# Patient Record
Sex: MALE | Race: WHITE | NOT HISPANIC OR LATINO | Employment: FULL TIME | URBAN - METROPOLITAN AREA
[De-identification: names, ages, dates, MRNs, and addresses within clinical notes are randomized per-mention and may not be internally consistent; named-entity substitution may affect disease eponyms.]

---

## 2017-03-15 ENCOUNTER — OFFICE VISIT (OUTPATIENT)
Dept: MEDICAL GROUP | Facility: MEDICAL CENTER | Age: 27
End: 2017-03-15
Attending: NURSE PRACTITIONER
Payer: MEDICAID

## 2017-03-15 VITALS
TEMPERATURE: 97.9 F | OXYGEN SATURATION: 97 % | HEART RATE: 86 BPM | DIASTOLIC BLOOD PRESSURE: 72 MMHG | HEIGHT: 74 IN | SYSTOLIC BLOOD PRESSURE: 122 MMHG | BODY MASS INDEX: 22.84 KG/M2 | RESPIRATION RATE: 12 BRPM | WEIGHT: 178 LBS

## 2017-03-15 DIAGNOSIS — R53.83 FATIGUE, UNSPECIFIED TYPE: ICD-10-CM

## 2017-03-15 DIAGNOSIS — M92.523 OSGOOD-SCHLATTER'S DISEASE OF KNEES, BILATERAL: ICD-10-CM

## 2017-03-15 DIAGNOSIS — Z91.018 FOOD ALLERGY: ICD-10-CM

## 2017-03-15 DIAGNOSIS — Z11.3 SCREENING FOR VENEREAL DISEASE: ICD-10-CM

## 2017-03-15 DIAGNOSIS — Z00.00 WELL ADULT EXAM: ICD-10-CM

## 2017-03-15 LAB
INT CON NEG: NEGATIVE
INT CON POS: POSITIVE
S PYO AG THROAT QL: NEGATIVE

## 2017-03-15 PROCEDURE — 99213 OFFICE O/P EST LOW 20 MIN: CPT | Performed by: NURSE PRACTITIONER

## 2017-03-15 PROCEDURE — 87880 STREP A ASSAY W/OPTIC: CPT | Performed by: NURSE PRACTITIONER

## 2017-03-15 PROCEDURE — 99202 OFFICE O/P NEW SF 15 MIN: CPT | Performed by: NURSE PRACTITIONER

## 2017-03-15 NOTE — PROGRESS NOTES
"Chief Complaint:     Mahesh Turk is a 26 y.o. male who presents for a general exam.     Pt is here for a new onset of allergy symptoms, congestion, abd bloating, runny nose and watery eyes. Pt states theses symptoms come on after his inguinal hernia on R side a yr ago. Symptoms get  worse when he is visiting the state of [place]. Pt denies any allergy Hx. Pt reported getting over sickness about a week ago.  Pt further states he has been having some issues with his hernia recovery, stating his pain has been 7-10 and has limited ROM effecting his ADLs. Pt used to be a runner and now can \"barely walk a mile\". Wife here states pt has not been as sexually active as in the the past d/t pain and movement restrictions. Pt is not taking any pain control medications at this time.   Pt denies fevers/N/V.  Pt and wife are planning to move to [facility] however awaiting for f/u with hernia. Pt has workers comp coverage and has an appt scheduled for tomorrow.  All medical, physiological and social Hx reviewed and negative.    He  has no past medical history of Diabetes, Allergy, Cancer (CMS-[facility]), or Asthma.  He  has past surgical history that includes hernia repair.  Family History   Problem Relation Age of Onset   • Hypertension Father    • Alcohol/Drug Father    • Alcohol/Drug Sister    • Cancer Maternal Aunt    • Cancer Maternal Uncle    • Alcohol/Drug Maternal Uncle    • Heart Disease Maternal Grandmother    • Heart Disease Maternal Grandfather    • Stroke Maternal Grandfather      Social History   Substance Use Topics   • Smoking status: Never Smoker    • Smokeless tobacco: No   • Alcohol Use: No       No current outpatient prescriptions on file.     No current facility-administered medications for this visit.    (including changes today)  Allergies: Ibuprofen and Vicodin    Last Td:   Has had HepA/Hep B vaccine: decline   Hx STDs: no  Currently sexually active: yes  Regular exercise: limited, situational, post " "hernia Sx    Review Of Systems  Denies fever, chills, or sweats  Skin: negative for rash, scaling, itching, pigmentation, hair or nail changes. .  Eyes: negative for visual blurring, double vision, eye pain, floaters and discharge from eyes  Ears/Nose/Throat: negative for tinnitus, vertigo, bleeding gums, dental problem and hoarseness, frequent URI's, sinus trouble, persistent sore throat  Respiratory: negative for persistent cough, hemoptysis, dyspnea, recurrent pneumonia or bronchitis, asthma and wheezing  Cardiovascular: negative for palpitations, tachycardia, irregular heart beat, chest pain, or peripheral edema.  Gastrointestinal: negative for poor appetite, dysphagia, nausea, heartburn or reflux, hemorrhoids, constipation or diarrhea.   Genitourinary: negative for nocturia, dysuria, frequency, hesitancy, incontinence, sexually transmitted disease, abnormal penile discharge, or ED.  Musculoskeletal: negative for joint swelling and muscle pain/ soreness. .  Neurologic: negative for migraine headaches, involuntary movements or tremor  Psychiatric: negative for excessive alcohol consumption or illegal drug usage, sleep disturbance, anxiety, depression, sexual difficulties  Hematologic/Lymphatic/Immunologic: negative for anemia, unusual bruising, swollen glands, HIV risk factors  Endocrine: negative for temperature intolerance, polydipsia, polyuria, unintentional weight changes.       PHYSICAL EXAMINATION:  Blood pressure 122/72, pulse 86, temperature 36.6 °C (97.9 °F), resp. rate 12, height 1.886 m (6' 2.25\"), weight 80.74 kg (178 lb), SpO2 97 %.  Body mass index is 22.7 kg/(m^2).  Wt Readings from Last 4 Encounters:   03/15/17 80.74 kg (178 lb)       General appearance:healthy, well developed, well nourished, well-groomed  Psych: alert, no distress, cooperative. Eye contact good, speech goal directed. Affect calm.   Eyes: conjunctivae and sclerae normal, lids and lashes normal, EOMI, PERRLA  ENT: Ears: external " ears normal to inspection, canals clear, TMs pearly gray with normal light reflex and anatomic landmarks, Nose/Sinuses: nose shows no deformity, asymmetry, or inflammation, nasal mucosa normal, no sinus tenderness, Oropharynx: lips normal without lesions, buccal mucosa normal, gums healthy, teeth intact, non-carious, palate normal, tongue midline and normal  Neck: no asymmetry, masses, adenopathy. Thyroid normal to palpation, carotids without bruits, no jugular venous distention  Lungs: clear to auscultation with good excursion, Normal respiratory rate. Chest symmetric no chest wall tenderness.  Cardiovascular: Regular rate and rhythm, no murmur.  No peripheral edema  Abdomen: No CVAT. Abdomen soft, non-tender, no masses palpable. No HSM or palpable renal abnormalities. Bowel sounds normal, no bruits heard.R pelvic tenderness and guarding.  Musculoskeletal: no evidence of joint effusion, ROM of all joints is normal, no crepitation detected, strength grossly normal UE and LE, proximal and distal motor groups. No deformities presentLimited ROM on RLE  Lymphatic: None significantly enlarged supraclavicular, axillary, or inguinal  Skin: color normal, vascularity normal, no rashes or suspicious lesions, no evidence of bleeding or bruising3 lap scars on R side and umbilicus  Neuro: speech normal, mental status intact, gait grossly normal, muscle tone normal.    ASSESSMENT/PLAN:  1. Well adult exam  LIPID PROFILE    CBC WITH DIFFERENTIAL   2. Screening for venereal disease  HIV ANTIBODIES    RPR    HEP C VIRUS ANTIBODY    CHLAMYDIA/GC PCR URINE OR SWAB   3. Food allergy  FOOD ALLERGY PANEL    ALLERGY ZONE 15   4. Fatigue, unspecified type  WESTERGREN SED RATE    URIC A+CAR+RA QN+CRP+ASO    LYME IGG/IGM AB    POCT Rapid Strep A (negative)   5. Osgood-Schlatter's disease of knees, bilateral  Pt is in process of moving to [facility], advised to f/u with established provider at [facility] new location. Discussed condition w/pt,  all questions answered.   Reviewed quad stretch/strength exercises and hamstring stretching     Labs per orders  Counseling about diet, exercise, skin care  Vaccinations per orders  HM:   Next office visit for recheck of chronic medical conditions is due in 3 wk for lab results and review and f/u on hernia consult.  PE, note and plan performed by Lise Romero, current student. I was present for the PE and agree with this note and plan

## 2017-03-15 NOTE — MR AVS SNAPSHOT
"        Mahesh Turk   3/15/2017 1:00 PM   Office Visit   MRN: [id number]    Department[facility]  [facility]   Dept Phone:  [phone]    Description:  Male : 1990   Provider:  DANIA Britt           Allergies as of 3/15/2017     Allergen Noted Reactions    Ibuprofen 2012       Vicodin [Hydrocodone-Acetaminophen] 2012         You were diagnosed with     Well adult exam   [394614]       Screening for venereal disease   [296554]       Food allergy   [706946]       Fatigue, unspecified type   [3477382]       Osgood-Schlatter's disease of knees, bilateral   [0141491]         Vital Signs     Blood Pressure Pulse Temperature Respirations Height Weight    122/72 mmHg 86 36.6 °C (97.9 °F) 12 1.886 m (6' 2.25\") 80.74 kg (178 lb)    Body Mass Index Oxygen Saturation Smoking Status             22.70 kg/m2 97% Never Smoker          Basic Information     Date Of Birth Sex Race Ethnicity Preferred Language    1990 Male White Non- English      Health Maintenance        Date Due Completion Dates    IMM HEP B VACCINE (1 of 3 - Primary Series) 1989 ---    IMM HEP A VACCINE (1 of 2 - Standard Series) 1990 ---    IMM VARICELLA (CHICKENPOX) VACCINE (1 of 2 - 2 Dose Adolescent Series) 2002 ---    IMM DTaP/Tdap/Td Vaccine (1 - Tdap) 2008 ---    IMM INFLUENZA (1) 2016 ---            Current Immunizations     No immunizations on file.      Below and/or attached are the medications your provider expects you to take. Review all of your home medications and newly ordered medications with your provider and/or pharmacist. Follow medication instructions as directed by your provider and/or pharmacist. Please keep your medication list with you and share with your provider. Update the information when medications are discontinued, doses are changed, or new medications (including over-the-counter products) are added; and carry medication information at all times in the " event of emergency situations     Allergies:  IBUPROFEN - (reactions not documented)     VICODIN - (reactions not documented)               Medications  Valid as of: March 15, 2017 -  1:57 PM    Generic Name Brand Name Tablet Size Instructions for use    .                 Medicines prescribed today were sent to:     [id number] - [place], [place] - [address] [facility] [address]    [address] [place] [address]    Phone: [phone] Fax: [phone]    Open 24 Hours?: No      Medication refill instructions:       If your prescription bottle indicates you have medication refills left, it is not necessary to call your provider’s office. Please contact your pharmacy and they will refill your medication.    If your prescription bottle indicates you do not have any refills left, you may request refills at any time through one of the following ways: The online "[employer]" system (except Urgent Care), by calling your provider’s office, or by asking your pharmacy to contact your provider’s office with a refill request. Medication refills are processed only during regular business hours and may not be available until the next business day. Your provider may request additional information or to have a follow-up visit with you prior to refilling your medication.   *Please Note: Medication refills are assigned a new Rx number when refilled electronically. Your pharmacy may indicate that no refills were authorized even though a new prescription for the same medication is available at the pharmacy. Please request the medicine by name with the pharmacy before contacting your provider for a refill.        Your To Do List     Future Labs/Procedures Complete By Expires    CBC WITH DIFFERENTIAL  As directed 3/15/2018    CHLAMYDIA/GC PCR URINE OR SWAB  As directed 3/15/2018    HEP C VIRUS ANTIBODY  As directed 3/15/2018    HIV ANTIBODIES  As directed 3/15/2018    LIPID PROFILE  As directed 3/15/2018     CHRIS SED RATE  As directed 3/15/2018         MyChart Status: Patient Declined

## 2017-03-16 ENCOUNTER — HOSPITAL ENCOUNTER (OUTPATIENT)
Dept: LAB | Facility: MEDICAL CENTER | Age: 27
End: 2017-03-16
Attending: NURSE PRACTITIONER
Payer: MEDICAID

## 2017-03-16 DIAGNOSIS — Z11.3 SCREENING FOR VENEREAL DISEASE: ICD-10-CM

## 2017-03-16 DIAGNOSIS — Z00.00 WELL ADULT EXAM: ICD-10-CM

## 2017-03-16 DIAGNOSIS — R53.83 FATIGUE, UNSPECIFIED TYPE: ICD-10-CM

## 2017-03-16 LAB
BASOPHILS # BLD AUTO: 0.05 K/UL (ref 0–0.12)
BASOPHILS NFR BLD AUTO: 1.2 % (ref 0–1.8)
CHOLEST SERPL-MCNC: 158 MG/DL (ref 100–199)
CRP SERPL HS-MCNC: 0.3 MG/L (ref 0–7.5)
EOSINOPHIL # BLD: 0.17 K/UL (ref 0–0.51)
EOSINOPHIL NFR BLD AUTO: 4 % (ref 0–6.9)
ERYTHROCYTE [DISTWIDTH] IN BLOOD BY AUTOMATED COUNT: 39.8 FL (ref 35.9–50)
ERYTHROCYTE [SEDIMENTATION RATE] IN BLOOD BY WESTERGREN METHOD: 0 MM/HOUR (ref 0–15)
HCT VFR BLD AUTO: 48.8 % (ref 42–52)
HCV AB S/CO SERPL IA: NEGATIVE
HDLC SERPL-MCNC: 47 MG/DL
HGB BLD-MCNC: 16.3 G/DL (ref 14–18)
HIV 1+2 AB+HIV1 P24 AG SERPL QL IA: NON REACTIVE
IMM GRANULOCYTES # BLD AUTO: 0 K/UL (ref 0–0.11)
IMM GRANULOCYTES NFR BLD AUTO: 0 % (ref 0–0.9)
LDLC SERPL CALC-MCNC: 98 MG/DL
LYMPHOCYTES # BLD: 1.94 K/UL (ref 1–4.8)
LYMPHOCYTES NFR BLD AUTO: 45.5 % (ref 22–41)
MCH RBC QN AUTO: 30.1 PG (ref 27–33)
MCHC RBC AUTO-ENTMCNC: 33.4 G/DL (ref 33.7–35.3)
MCV RBC AUTO: 90.2 FL (ref 81.4–97.8)
MONOCYTES # BLD: 0.39 K/UL (ref 0–0.85)
MONOCYTES NFR BLD AUTO: 9.2 % (ref 0–13.4)
NEUTROPHILS # BLD: 1.71 K/UL (ref 1.82–7.42)
NEUTROPHILS NFR BLD AUTO: 40.1 % (ref 44–72)
NRBC # BLD AUTO: 0 K/UL
NRBC BLD-RTO: 0 /100 WBC
PLATELET # BLD AUTO: 246 K/UL (ref 164–446)
PMV BLD AUTO: 11 FL (ref 9–12.9)
RBC # BLD AUTO: 5.41 M/UL (ref 4.7–6.1)
RHEUMATOID FACT SERPL-ACNC: <10 IU/ML (ref 0–14)
TRIGL SERPL-MCNC: 64 MG/DL (ref 0–149)
URATE SERPL-MCNC: 3.9 MG/DL (ref 2.5–8.3)
WBC # BLD AUTO: 4.3 K/UL (ref 4.8–10.8)

## 2017-03-16 PROCEDURE — 86803 HEPATITIS C AB TEST: CPT

## 2017-03-16 PROCEDURE — 85652 RBC SED RATE AUTOMATED: CPT

## 2017-03-16 PROCEDURE — 86038 ANTINUCLEAR ANTIBODIES: CPT

## 2017-03-16 PROCEDURE — 82785 ASSAY OF IGE: CPT

## 2017-03-16 PROCEDURE — 80061 LIPID PANEL: CPT

## 2017-03-16 PROCEDURE — 36415 COLL VENOUS BLD VENIPUNCTURE: CPT

## 2017-03-16 PROCEDURE — 86431 RHEUMATOID FACTOR QUANT: CPT

## 2017-03-16 PROCEDURE — 86617 LYME DISEASE ANTIBODY: CPT

## 2017-03-16 PROCEDURE — 87491 CHLMYD TRACH DNA AMP PROBE: CPT

## 2017-03-16 PROCEDURE — 86141 C-REACTIVE PROTEIN HS: CPT

## 2017-03-16 PROCEDURE — 84550 ASSAY OF BLOOD/URIC ACID: CPT

## 2017-03-16 PROCEDURE — 85025 COMPLETE CBC W/AUTO DIFF WBC: CPT

## 2017-03-16 PROCEDURE — 87591 N.GONORRHOEAE DNA AMP PROB: CPT

## 2017-03-16 PROCEDURE — 86060 ANTISTREPTOLYSIN O TITER: CPT

## 2017-03-16 PROCEDURE — 87389 HIV-1 AG W/HIV-1&-2 AB AG IA: CPT

## 2017-03-16 PROCEDURE — 86003 ALLG SPEC IGE CRUDE XTRC EA: CPT | Mod: 91

## 2017-03-17 LAB
C TRACH DNA GENITAL QL NAA+PROBE: NEGATIVE
N GONORRHOEA DNA GENITAL QL NAA+PROBE: NEGATIVE
SPECIMEN SOURCE: NORMAL

## 2017-03-18 LAB
ASO AB SERPL-ACNC: 171 IU/ML (ref 0–330)
B BURGDOR IGG SER QL IB: NEGATIVE
B BURGDOR IGM SER QL IB: NEGATIVE
NUCLEAR IGG SER QL IA: NORMAL

## 2017-03-22 LAB
A ALTERNATA IGE QN: <0.1 KU/L
A FUMIGATUS IGE QN: <0.1 KU/L
ALMOND IGE QN: 0.1 KU/L
AVOCADO IGE QN: <0.1 KU/L
BANANA IGE QN: 0.1 KU/L
BERMUDA GRASS IGE QN: <0.1 KU/L
BOXELDER IGE QN: <0.1 KU/L
C SPHAEROSPERMUM IGE QN: <0.1 KU/L
CAT DANDER IGE QN: <0.1 KU/L
CELERY IGE QN: <0.1 KU/L
CHESTNUT IGE QN: <0.1 KU/L
CMN PIGWEED IGE QN: <0.1 KU/L
COCONUT IGE QN: <0.1 KU/L
COMMON RAGWEED IGE QN: <0.1 KU/L
COTTONWOOD IGE QN: <0.1 KU/L
COW MILK IGE QN: <0.1 KU/L
D FARINAE IGE QN: 19.3 KU/L
D PTERONYSS IGE QN: 30.4 KU/L
DEPRECATED MISC ALLERGEN IGE RAST QL: ABNORMAL
DOG DANDER IGE QN: <0.1 KU/L
EGG WHITE IGE QN: <0.1 KU/L
GRAPE IGE QN: <0.1 KU/L
IGE SERPL-ACNC: 298 KU/L
KIWIFRUIT IGE QN: <0.1 KU/L
M RACEMOSUS IGE QN: <0.1 KU/L
MOUSE EPITH IGE QN: <0.1 KU/L
MT JUNIPER IGE QN: <0.1 KU/L
MUGWORT IGE QN: <0.1 KU/L
OAT IGE QN: <0.1 KU/L
OLIVE POLN IGE QN: <0.1 KU/L
P NOTATUM IGE QN: <0.1 KU/L
PAPAYA IGE QN: <0.1 KU/L
PEANUT IGE QN: <0.1 KU/L
PECAN/HICK NUT IGE QN: <0.1 KU/L
POTATO IGE QN: <0.1 KU/L
ROACH IGE QN: 0.3 KU/L
SALTWORT IGE QN: <0.1 KU/L
SESAME SEED IGE QN: <0.1 KU/L
SOYBEAN IGE QN: <0.1 KU/L
TIMOTHY IGE QN: 0.21 KU/L
TOMATO IGE QN: <0.1 KU/L
WATERMELON IGE QN: <0.1 KU/L
WHEAT IGE QN: <0.1 KU/L
WHITE ELM IGE QN: <0.1 KU/L
WHITE MULBERRY IGE QN: <0.1 KU/L
WHITE OAK IGE QN: <0.1 KU/L

## 2017-04-03 ENCOUNTER — OFFICE VISIT (OUTPATIENT)
Dept: MEDICAL GROUP | Facility: MEDICAL CENTER | Age: 27
End: 2017-04-03
Attending: NURSE PRACTITIONER
Payer: MEDICAID

## 2017-04-03 VITALS
OXYGEN SATURATION: 97 % | BODY MASS INDEX: 22.59 KG/M2 | WEIGHT: 176 LBS | HEIGHT: 74 IN | HEART RATE: 64 BPM | DIASTOLIC BLOOD PRESSURE: 75 MMHG | TEMPERATURE: 97 F | RESPIRATION RATE: 16 BRPM | SYSTOLIC BLOOD PRESSURE: 118 MMHG

## 2017-04-03 DIAGNOSIS — J30.89 ALLERGIC RHINITIS DUE TO OTHER ALLERGIC TRIGGER: ICD-10-CM

## 2017-04-03 DIAGNOSIS — Z09 FOLLOW UP: ICD-10-CM

## 2017-04-03 PROCEDURE — 99212 OFFICE O/P EST SF 10 MIN: CPT | Performed by: NURSE PRACTITIONER

## 2017-04-03 PROCEDURE — 99213 OFFICE O/P EST LOW 20 MIN: CPT | Performed by: NURSE PRACTITIONER

## 2017-04-03 NOTE — PROGRESS NOTES
"Subjective:     Chief Complaint   Patient presents with   • Follow-Up     lab results      Mahesh Turk is a 26 y.o. male here today for multiple problems as listed below    Mahesh is here to f/u on labs r/t his new-onset malaise, runny nose, sore throat and low grade fevers. He originally thought these symptoms were related to his hernia repair, which failed. He is currently awaiting a follow-up correction surgery, as allowed by workers compensation.    Today he states his runny nose, sore throat symptoms are unchanged. They are persistent, worse in the morning, and improve with exercise. Worsen when he is in a \"dirty house\"    No pertinent past medical history, social history or family medical history     Current medicines (including changes today)  No current outpatient prescriptions on file.     No current facility-administered medications for this visit.     He  has no past medical history of Diabetes, Allergy, Cancer (CMS-[facility]), or Asthma.      Current medications, allergies and problems list reviewed and updated in EPIC.      ROS   As above in HPI. All other systems reviewed and are negative        Objective:     Blood pressure 118/75, pulse 64, temperature 36.1 °C (97 °F), resp. rate 16, height 1.88 m (6' 2\"), weight 79.833 kg (176 lb), SpO2 97 %. Body mass index is 22.59 kg/(m^2).   Physical Exam:  Alert, oriented in no acute distress.  Eye contact is good, speech goal directed, affect calm  HEENT: conjunctiva with mild injection b/l, sclera non-icteric.  Pinna normal.   Oral mucous membranes pink and moist with no lesions.  Neck: No adenopathy or masses in the neck or supraclavicular regions. No JVD.  Ext: no edema, color normal, vascularity normal, temperature normal  Skin: no rashes or lesions in visible areas.      Assessment and Plan:   The following treatment plan was discussed   1. Allergic rhinitis due to other allergic trigger  Reviewed allergy testing, and informed pt he has a dust mite " allergy.   Discussed ways to decrease triggers as well as natural remedies for allergies.   Pt is moving to [place] next month.   Advised they find an ND to treat symptoms as they are hesitant to western medicine.  Educational handouts given   2. Follow up  Pt has follow-up for surgical repair in 2 weeks       Followup: PRN

## 2017-04-03 NOTE — MR AVS SNAPSHOT
"        Mahesh Henrynell   4/3/2017 10:20 AM   Office Visit   MRN: [id number]    Department:  [facility]care [facility]   Dept Phone:  [phone]    Description:  Male : 1990   Provider:  DANIA Britt           Reason for Visit     Follow-Up lab results       Allergies as of 4/3/2017     Allergen Noted Reactions    Ibuprofen 2012       Vicodin [Hydrocodone-Acetaminophen] 2012         Vital Signs     Blood Pressure Pulse Temperature Respirations Height Weight    118/75 mmHg 64 36.1 °C (97 °F) 16 1.88 m (6' 2\") 79.833 kg (176 lb)    Body Mass Index Oxygen Saturation Smoking Status             22.59 kg/m2 97% Never Smoker          Basic Information     Date Of Birth Sex Race Ethnicity Preferred Language    1990 Male White Non- English      Health Maintenance        Date Due Completion Dates    IMM HEP B VACCINE (1 of 3 - Primary Series) 1989 ---    IMM HEP A VACCINE (1 of 2 - Standard Series) 1990 ---    IMM VARICELLA (CHICKENPOX) VACCINE (1 of 2 - 2 Dose Adolescent Series) 2002 ---    IMM DTaP/Tdap/Td Vaccine (1 - Tdap) 2008 ---            Current Immunizations     No immunizations on file.      Below and/or attached are the medications your provider expects you to take. Review all of your home medications and newly ordered medications with your provider and/or pharmacist. Follow medication instructions as directed by your provider and/or pharmacist. Please keep your medication list with you and share with your provider. Update the information when medications are discontinued, doses are changed, or new medications (including over-the-counter products) are added; and carry medication information at all times in the event of emergency situations     Allergies:  IBUPROFEN - (reactions not documented)     VICODIN - (reactions not documented)               Medications  Valid as of: 2017 - 10:55 AM    Generic Name Brand Name Tablet Size Instructions " for use    .                 Medicines prescribed today were sent to:     [id number] - [place], [place] - [address] [facility] [facility] [address] [facility] [address] [facility] [address]    [address] [place] [address]    Phone: [phone] Fax: [phone]    Open 24 Hours?: No      Medication refill instructions:       If your prescription bottle indicates you have medication refills left, it is not necessary to call your provider’s office. Please contact your pharmacy and they will refill your medication.    If your prescription bottle indicates you do not have any refills left, you may request refills at any time through one of the following ways: The online [employer] system (except Urgent Care), by calling your provider’s office, or by asking your pharmacy to contact your provider’s office with a refill request. Medication refills are processed only during regular business hours and may not be available until the next business day. Your provider may request additional information or to have a follow-up visit with you prior to refilling your medication.   *Please Note: Medication refills are assigned a new Rx number when refilled electronically. Your pharmacy may indicate that no refills were authorized even though a new prescription for the same medication is available at the pharmacy. Please request the medicine by name with the pharmacy before contacting your provider for a refill.           MyChart Status: Patient Declined

## 2017-06-05 ENCOUNTER — HOSPITAL ENCOUNTER (OUTPATIENT)
Facility: MEDICAL CENTER | Age: 27
End: 2017-06-05
Attending: NURSE PRACTITIONER
Payer: MEDICAID

## 2017-06-05 ENCOUNTER — OFFICE VISIT (OUTPATIENT)
Dept: MEDICAL GROUP | Facility: MEDICAL CENTER | Age: 27
End: 2017-06-05
Attending: NURSE PRACTITIONER
Payer: MEDICAID

## 2017-06-05 VITALS
HEIGHT: 74 IN | RESPIRATION RATE: 20 BRPM | OXYGEN SATURATION: 96 % | TEMPERATURE: 97.7 F | WEIGHT: 175 LBS | DIASTOLIC BLOOD PRESSURE: 64 MMHG | BODY MASS INDEX: 22.46 KG/M2 | HEART RATE: 80 BPM | SYSTOLIC BLOOD PRESSURE: 106 MMHG

## 2017-06-05 DIAGNOSIS — B37.2 CANDIDAL DERMATITIS: ICD-10-CM

## 2017-06-05 DIAGNOSIS — K59.00 CONSTIPATION, UNSPECIFIED CONSTIPATION TYPE: ICD-10-CM

## 2017-06-05 DIAGNOSIS — R19.7 DIARRHEA, UNSPECIFIED TYPE: ICD-10-CM

## 2017-06-05 DIAGNOSIS — R31.9 HEMATURIA: ICD-10-CM

## 2017-06-05 PROBLEM — M92.523 OSGOOD-SCHLATTER'S DISEASE OF KNEES, BILATERAL: Status: ACTIVE | Noted: 2017-06-05

## 2017-06-05 LAB
APPEARANCE UR: CLEAR
BILIRUB UR STRIP-MCNC: NORMAL MG/DL
COLOR UR AUTO: YELLOW
GLUCOSE UR STRIP.AUTO-MCNC: NORMAL MG/DL
KETONES UR STRIP.AUTO-MCNC: NORMAL MG/DL
LEUKOCYTE ESTERASE UR QL STRIP.AUTO: NORMAL
NITRITE UR QL STRIP.AUTO: NORMAL
PH UR STRIP.AUTO: 5 [PH] (ref 5–8)
PROT UR QL STRIP: NORMAL MG/DL
RBC UR QL AUTO: NORMAL
SP GR UR STRIP.AUTO: 1.02
UROBILINOGEN UR STRIP-MCNC: NORMAL MG/DL

## 2017-06-05 PROCEDURE — 99214 OFFICE O/P EST MOD 30 MIN: CPT | Performed by: NURSE PRACTITIONER

## 2017-06-05 PROCEDURE — 81002 URINALYSIS NONAUTO W/O SCOPE: CPT | Performed by: NURSE PRACTITIONER

## 2017-06-05 PROCEDURE — 81003 URINALYSIS AUTO W/O SCOPE: CPT

## 2017-06-05 PROCEDURE — 99213 OFFICE O/P EST LOW 20 MIN: CPT | Performed by: NURSE PRACTITIONER

## 2017-06-05 RX ORDER — CLOTRIMAZOLE 1 %
CREAM (GRAM) TOPICAL
Qty: 1 TUBE | Refills: 0 | Status: SHIPPED | OUTPATIENT
Start: 2017-06-05

## 2017-06-05 NOTE — MR AVS SNAPSHOT
"        Mahesh Turk   2017 4:20 PM   Office Visit   MRN: [id number]    Department:  [facility]care Center   Dept Phone:  [phone]    Description:  Male : 1990   Provider:  DANIA Britt           Reason for Visit     Urinary Frequency x3days     Rash x1wk in genital area       Allergies as of 2017     Allergen Noted Reactions    Ibuprofen 2012       Vicodin [Hydrocodone-Acetaminophen] 2012         You were diagnosed with     Constipation, unspecified constipation type   [2274301]       Diarrhea, unspecified type   [8111082]       Hematuria   [8096681]       Candidal dermatitis   [398630]         Vital Signs     Blood Pressure Pulse Temperature Respirations Height Weight    106/64 mmHg 80 36.5 °C (97.7 °F) 20 1.867 m (6' 1.5\") 79.379 kg (175 lb)    Body Mass Index Oxygen Saturation Smoking Status             22.77 kg/m2 96% Never Smoker          Basic Information     Date Of Birth Sex Race Ethnicity Preferred Language    1990 Male White Non- English      Problem List              ICD-10-CM Priority Class Noted - Resolved    Constipation K59.00   2017 - Present    Diarrhea R19.7   2017 - Present    Osgood-Schlatter's disease of knees, bilateral M92.51, M92.52   2017 - Present      Health Maintenance        Date Due Completion Dates    IMM HEP B VACCINE (1 of 3 - Primary Series) 1989 ---    IMM HEP A VACCINE (1 of 2 - Standard Series) 1990 ---    IMM VARICELLA (CHICKENPOX) VACCINE (1 of 2 - 2 Dose Adolescent Series) 2002 ---    IMM DTaP/Tdap/Td Vaccine (1 - Tdap) 2008 ---            Current Immunizations     No immunizations on file.      Below and/or attached are the medications your provider expects you to take. Review all of your home medications and newly ordered medications with your provider and/or pharmacist. Follow medication instructions as directed by your provider and/or pharmacist. Please keep your medication list " with you and share with your provider. Update the information when medications are discontinued, doses are changed, or new medications (including over-the-counter products) are added; and carry medication information at all times in the event of emergency situations     Allergies:  IBUPROFEN - (reactions not documented)     VICODIN - (reactions not documented)               Medications  Valid as of: June 05, 2017 -  5:01 PM    Generic Name Brand Name Tablet Size Instructions for use    Clotrimazole (Cream) LOTRIMIN 1 % AAA BID until rash resolves        Probiotic Product   Take  by mouth.        .                 Medicines prescribed today were sent to:     [facility] [address] [place] [place] - [address] AT [facility] [address] [facility] & [facility]    [address] [address]    Phone: [phone] Fax: [phone]    Open 24 Hours?: No    [facility] [address], [place] - [address]    [address] [address]    Phone: [phone] Fax: [phone]    Open 24 Hours?: No      Medication refill instructions:       If your prescription bottle indicates you have medication refills left, it is not necessary to call your provider’s office. Please contact your pharmacy and they will refill your medication.    If your prescription bottle indicates you do not have any refills left, you may request refills at any time through one of the following ways: The online [employer] system (except Urgent Care), by calling your provider’s office, or by asking your pharmacy to contact your provider’s office with a refill request. Medication refills are processed only during regular business hours and may not be available until the next business day. Your provider may request additional information or to have a follow-up visit with you prior to refilling your medication.   *Please Note: Medication refills are assigned a new Rx number when refilled electronically. Your pharmacy may indicate that no  refills were authorized even though a new prescription for the same medication is available at the pharmacy. Please request the medicine by name with the pharmacy before contacting your provider for a refill.        Your To Do List     Future Labs/Procedures Complete By Expires    URINALYSIS,CULTURE IF INDICATED  As directed 6/5/2018      Referral     A referral request has been sent to our patient care coordination department. Please allow 3-5 business days for us to process this request and contact you either by phone or mail. If you do not hear from us by the 5th business day, please call us at [phone].           MyChart Status: Patient Declined

## 2017-06-05 NOTE — PROGRESS NOTES
"Subjective:     Chief Complaint   Patient presents with   • Urinary Frequency     x3days    • Rash     x1wk in genital area      Mahesh Turk is a 26 y.o. male here today for multiple problems as listed below    Mahesh is here for new-onset constipation and diarrhea. States he has alternating episodes of constipation and diarrhea about 2x per week ever since the hernia surgery. Denies nausea, heartburn, or reflux. States the other days of the week he stools easily and feels he has complete evacuation. Has a very healthy diet and eats william seeds daily, is also taking a probiotic. He denies fevers. No outside country travel, although he and his wife were recently camping and bathing in river water, but the symptoms started before that.    He also c/o of a new-onset red rash on his scrotum that started a few days ago. It is itchy, and he thinks it is yeast. His wife also had a yeast infection after their camping trip.    Finally, he is having some new-onset urinary frequency but no urgency or dysuria. This has also been occuring for the past few days. No fevers.     Current medicines (including changes today)  Current Outpatient Prescriptions   Medication Sig Dispense Refill   • Probiotic Product (PROBIOTIC & ACIDOPHILUS EX ST PO) Take  by mouth.     • clotrimazole (LOTRIMIN) 1 % Cream AAA BID until rash resolves 1 Tube 0     No current facility-administered medications for this visit.     He  has no past medical history of Diabetes, Allergy, Cancer (CMS-[facility]), or Asthma.      Current medications, allergies and problems list reviewed and updated in EPIC.      ROS   As above in HPI. All other systems reviewed and are negative        Objective:     Blood pressure 106/64, pulse 80, temperature 36.5 °C (97.7 °F), resp. rate 20, height 1.867 m (6' 1.5\"), weight 79.379 kg (175 lb), SpO2 96 %. Body mass index is 22.77 kg/(m^2).   Physical Exam:  Alert, oriented in no acute distress.  Eye contact is good, speech goal " "directed, affect calm  Lungs: clear to auscultation bilaterally with good excursion.  CV: regular rate and rhythm.  Abdomen: soft, nontender, No CVAT, c/o mild \"twinge\" in LLQ and RMQ, non-radiating  : deferred per pt comfort, he demonstrates a picture of his scrotum which shows a red rash with clearly define borders and maceration      Assessment and Plan:   The following treatment plan was discussed   1. Constipation, unspecified constipation type  REFERRAL TO GASTROENTEROLOGY   2. Diarrhea, unspecified type  REFERRAL TO GASTROENTEROLOGY  Suspect IBS  Declines stool culture and additional labwork today, prefers to wait for GI consult   3. Hematuria  URINALYSIS,CULTURE IF INDICATED   4. Candidal dermatitis  Begin clotrmazole BID until sx resolve       Followup: PRN  "

## 2017-06-06 DIAGNOSIS — R31.9 HEMATURIA: ICD-10-CM

## 2017-06-06 LAB
APPEARANCE UR: CLEAR
BILIRUB UR QL STRIP.AUTO: NEGATIVE
COLOR UR: NORMAL
CULTURE IF INDICATED INDCX: NO UA CULTURE
GLUCOSE UR STRIP.AUTO-MCNC: NEGATIVE MG/DL
KETONES UR STRIP.AUTO-MCNC: NEGATIVE MG/DL
LEUKOCYTE ESTERASE UR QL STRIP.AUTO: NEGATIVE
MICRO URNS: NORMAL
NITRITE UR QL STRIP.AUTO: NEGATIVE
PH UR STRIP.AUTO: 7 [PH]
PROT UR QL STRIP: NEGATIVE MG/DL
RBC UR QL AUTO: NEGATIVE
SP GR UR STRIP.AUTO: 1.01

## 2017-07-12 ENCOUNTER — OFFICE VISIT (OUTPATIENT)
Dept: MEDICAL GROUP | Facility: MEDICAL CENTER | Age: 27
End: 2017-07-12
Attending: INTERNAL MEDICINE
Payer: MEDICAID

## 2017-07-12 VITALS
OXYGEN SATURATION: 97 % | SYSTOLIC BLOOD PRESSURE: 122 MMHG | HEART RATE: 80 BPM | TEMPERATURE: 98.7 F | BODY MASS INDEX: 22.84 KG/M2 | DIASTOLIC BLOOD PRESSURE: 70 MMHG | WEIGHT: 178 LBS | HEIGHT: 74 IN | RESPIRATION RATE: 16 BRPM

## 2017-07-12 DIAGNOSIS — R29.898 POPPING OF TEMPOROMANDIBULAR JOINT ON OPENING OF JAW: ICD-10-CM

## 2017-07-12 DIAGNOSIS — R09.82 POST-NASAL DRIP: ICD-10-CM

## 2017-07-12 PROCEDURE — 99214 OFFICE O/P EST MOD 30 MIN: CPT | Performed by: INTERNAL MEDICINE

## 2017-07-12 PROCEDURE — 99213 OFFICE O/P EST LOW 20 MIN: CPT | Performed by: INTERNAL MEDICINE

## 2017-07-12 ASSESSMENT — PATIENT HEALTH QUESTIONNAIRE - PHQ9: CLINICAL INTERPRETATION OF PHQ2 SCORE: 0

## 2017-07-12 NOTE — ASSESSMENT & PLAN NOTE
Patient reports popping of his jaw on the right for the past month every time he opens his mouth.  He denies jaw pain, jaw getting stuck.  Unsure if he clenches his teeth at night, but thinks he may do this.

## 2017-07-12 NOTE — ASSESSMENT & PLAN NOTE
Patient presents today with about a week of worsening postnasal drip. He states that his throat has been itching and his voice has gotten more hoarse, and he feels like he has mucus draining down the back of his throat. He states he has had worsening allergies ever since he had a mesh placed for his hernia repair. He had some allergy testing done and was very allergic to dust mites. He does not currently take any over-the-counter allergy medications. He prefers a holistic approach. He has never used any nasal sprays.

## 2017-07-12 NOTE — MR AVS SNAPSHOT
"        Mahesh Turk   2017 12:50 PM   Office Visit   MRN: [id number]    [facility]   Dept Phone:  [phone]    Description:  Male : 1990   Provider:  Verna Mazariegos M.D.           Reason for Visit     Follow-Up alergy      Allergies as of 2017     Allergen Noted Reactions    Ibuprofen 2012       Vicodin [Hydrocodone-Acetaminophen] 2012         You were diagnosed with     Post-nasal drip   [888193]         Vital Signs     Blood Pressure Pulse Temperature Respirations Height Weight    122/70 mmHg 80 37.1 °C (98.7 °F) 16 1.867 m (6' 1.5\") 80.74 kg (178 lb)    Body Mass Index Oxygen Saturation Smoking Status             23.16 kg/m2 97% Never Smoker          Basic Information     Date Of Birth Sex Race Ethnicity Preferred Language    1990 Male White Non- English      Problem List              ICD-10-CM Priority Class Noted - Resolved    Constipation K59.00   2017 - Present    Diarrhea R19.7   2017 - Present    Osgood-Schlatter's disease of knees, bilateral M92.51, M92.52   2017 - Present    Post-nasal drip R09.82   2017 - Present      Health Maintenance        Date Due Completion Dates    IMM DTaP/Tdap/Td Vaccine (1 - Tdap) 2008 ---    IMM INFLUENZA (1) 2017 ---            Current Immunizations     No immunizations on file.      Below and/or attached are the medications your provider expects you to take. Review all of your home medications and newly ordered medications with your provider and/or pharmacist. Follow medication instructions as directed by your provider and/or pharmacist. Please keep your medication list with you and share with your provider. Update the information when medications are discontinued, doses are changed, or new medications (including over-the-counter products) are added; and carry medication information at all times in the event of emergency situations     Allergies:  IBUPROFEN - (reactions " not documented)     VICODIN - (reactions not documented)               Medications  Valid as of: July 12, 2017 -  1:17 PM    Generic Name Brand Name Tablet Size Instructions for use    Clotrimazole (Cream) LOTRIMIN 1 % AAA BID until rash resolves        Probiotic Product   Take  by mouth.        .                 Medicines prescribed today were sent to:     [facility] [facility], [place] - [address] AT [facility]    [address] [facility] [place] [address]    Phone: [phone] Fax: [phone]    Open 24 Hours?: No    [facility] [address] [facility], [place] - [address] [facility]    [address] [facility] [address]    Phone: [phone] Fax: [phone]    Open 24 Hours?: No      Medication refill instructions:       If your prescription bottle indicates you have medication refills left, it is not necessary to call your provider’s office. Please contact your pharmacy and they will refill your medication.    If your prescription bottle indicates you do not have any refills left, you may request refills at any time through one of the following ways: The online BeInSync system (except Urgent Care), by calling your provider’s office, or by asking your pharmacy to contact your provider’s office with a refill request. Medication refills are processed only during regular business hours and may not be available until the next business day. Your provider may request additional information or to have a follow-up visit with you prior to refilling your medication.   *Please Note: Medication refills are assigned a new Rx number when refilled electronically. Your pharmacy may indicate that no refills were authorized even though a new prescription for the same medication is available at the pharmacy. Please request the medicine by name with the pharmacy before contacting your provider for a refill.           BeInSync Access Code: [email or web address]  Expires: 8/11/2017 11:33 AM    InstraGrokj[employer] JOLLY secure,  online tool to manage your health information     [employer]’s GemShare® is a secure, online tool that connects you to your personalized health information from the privacy of your home -- day or night - making it very easy for you to manage your healthcare. Once the activation process is completed, you can even access your medical information using the GemShare alexander, which is available for free in the Apple Alexander store or Google Play store.     GemShare provides the following levels of access (as shown below):   My Chart Features   [facility] Doctor [facility]  Specialists [facility]  Urgent  Care Non-[facility]  Primary Care  Doctor   Email your healthcare team securely and privately 24/7 X X X    Manage appointments: schedule your next appointment; view details of past/upcoming appointments X      Request prescription refills. X      View recent personal medical records, including lab and immunizations X X X X   View health record, including health history, allergies, medications X X X X   Read reports about your outpatient visits, procedures, consult and ER notes X X X X   See your discharge summary, which is a recap of your hospital and/or ER visit that includes your diagnosis, lab results, and care plan. X X       How to register for GemShare:  1. Go to  [email or web address].  2. Click on the Sign Up Now box, which takes you to the New Member Sign Up page. You will need to provide the following information:  a. Enter your GemShare Access Code exactly as it appears at the top of this page. (You will not need to use this code after you’ve completed the sign-up process. If you do not sign up before the expiration date, you must request a new code.)   b. Enter your date of birth.   c. Enter your home email address.   d. Click Submit, and follow the next screen’s instructions.  3. Create a GemShare ID. This will be your GemShare login ID and cannot be changed, so think of one that is secure and easy to  remember.  4. Create a [employer] password. You can change your password at any time.  5. Enter your Password Reset Question and Answer. This can be used at a later time if you forget your password.   6. Enter your e-mail address. This allows you to receive e-mail notifications when new information is available in [employer].  7. Click Sign Up. You can now view your health information.    For assistance activating your [employer] account, call [phone]

## 2017-07-12 NOTE — PROGRESS NOTES
"Subjective:   Mahesh Turk is a 27 y.o. male here today for throat itching, dripping    Post-nasal drip  Patient presents today with about a week of worsening postnasal drip. He states that his throat has been itching and his voice has gotten more hoarse, and he feels like he has mucus draining down the back of his throat. He states he has had worsening allergies ever since he had a mesh placed for his hernia repair. He had some allergy testing done and was very allergic to dust mites. He does not currently take any over-the-counter allergy medications. He prefers a holistic approach. He has never used any nasal sprays.     Popping of temporomandibular joint on opening of jaw  Patient reports popping of his jaw on the right for the past month every time he opens his mouth.  He denies jaw pain, jaw getting stuck.  Unsure if he clenches his teeth at night, but thinks he may do this.         Current medicines (including changes today)  Current Outpatient Prescriptions   Medication Sig Dispense Refill   • Probiotic Product (PROBIOTIC & ACIDOPHILUS EX ST PO) Take  by mouth.     • clotrimazole (LOTRIMIN) 1 % Cream AAA BID until rash resolves 1 Tube 0     No current facility-administered medications for this visit.     He  has no past medical history of Diabetes, Allergy, Cancer (CMS-[facility]), or Asthma.    ROS   As above in HPI     Objective:     Blood pressure 122/70, pulse 80, temperature 37.1 °C (98.7 °F), resp. rate 16, height 1.867 m (6' 1.5\"), weight 80.74 kg (178 lb), SpO2 97 %. Body mass index is 23.16 kg/(m^2).   Physical Exam:  Constitutional: Alert, no distress.  Skin: Warm, dry, good turgor, no rashes in visible areas.  Eye: Equal, round and reactive, conjunctiva mildly injected, allergic shiners bilaterally.  ENMT: Lips without lesions, good dentition, oropharynx with mild cobblestoning, TM's obscured by wax bilaterally, nares normal, TMJ non tender to palpation bilaterally.  Neck: Trachea midline, no " masses, no thyromegaly. No cervical or supraclavicular lymphadenopathy  Psych: Alert and oriented x3, normal affect and mood.      Assessment and Plan:   The following treatment plan was discussed    1. Post-nasal drip  History and physical exam are consistent with postnasal drip related to allergies.  We discussed a nasal spray such as flonase, but patient wishes to avoid prescription meds.  I recommended using a laura pot 1-2 times per day to help with symptoms.    -laura pot PRN    2. Jaw popping/TMJ symptoms  Discussed with patient that he may be clenching or grinding his teeth at night. I recommended a  that he can purchase over-the-counter. He has a dentist that he sees and I recommended follow-up with his dentist to make sure his bite is appropriate and for a possible prescription  as well.  -OTC   -follow up with dentist    Followup: Return if symptoms worsen or fail to improve.

## 2017-08-23 ENCOUNTER — OFFICE VISIT (OUTPATIENT)
Dept: MEDICAL GROUP | Facility: MEDICAL CENTER | Age: 27
End: 2017-08-23
Attending: NURSE PRACTITIONER
Payer: MEDICAID

## 2017-08-23 ENCOUNTER — HOSPITAL ENCOUNTER (OUTPATIENT)
Dept: LAB | Facility: MEDICAL CENTER | Age: 27
End: 2017-08-23
Attending: NURSE PRACTITIONER
Payer: MEDICAID

## 2017-08-23 VITALS
TEMPERATURE: 97.7 F | SYSTOLIC BLOOD PRESSURE: 112 MMHG | WEIGHT: 175 LBS | OXYGEN SATURATION: 95 % | HEIGHT: 74 IN | BODY MASS INDEX: 22.46 KG/M2 | RESPIRATION RATE: 14 BRPM | HEART RATE: 84 BPM | DIASTOLIC BLOOD PRESSURE: 62 MMHG

## 2017-08-23 DIAGNOSIS — R19.7 DIARRHEA, UNSPECIFIED TYPE: ICD-10-CM

## 2017-08-23 DIAGNOSIS — R10.84 GENERALIZED ABDOMINAL PAIN: ICD-10-CM

## 2017-08-23 DIAGNOSIS — B37.2 CANDIDAL INTERTRIGO: ICD-10-CM

## 2017-08-23 LAB
HAV IGM SERPL QL IA: NEGATIVE
HBV CORE IGM SER QL: NEGATIVE
HBV SURFACE AG SER QL: NEGATIVE
HCV AB SER QL: NEGATIVE

## 2017-08-23 PROCEDURE — 80074 ACUTE HEPATITIS PANEL: CPT

## 2017-08-23 PROCEDURE — 99214 OFFICE O/P EST MOD 30 MIN: CPT | Performed by: NURSE PRACTITIONER

## 2017-08-23 PROCEDURE — 99212 OFFICE O/P EST SF 10 MIN: CPT | Performed by: NURSE PRACTITIONER

## 2017-08-23 PROCEDURE — 36415 COLL VENOUS BLD VENIPUNCTURE: CPT

## 2017-08-23 RX ORDER — FLUCONAZOLE 150 MG/1
150 TABLET ORAL
Qty: 4 TAB | Refills: 1 | Status: SHIPPED | OUTPATIENT
Start: 2017-08-23 | End: 2017-09-06 | Stop reason: SDUPTHER

## 2017-08-23 NOTE — MR AVS SNAPSHOT
"        Mahesh Turk   2017 11:40 AM   Office Visit   MRN: [id number]    Department[facility]   Dept Phone:  [phone]    Description:  Male : 1990   Provider:  DANIA Britt           Allergies as of 2017     Allergen Noted Reactions    Ibuprofen 2012       Vicodin [Hydrocodone-Acetaminophen] 2012         You were diagnosed with     Diarrhea, unspecified type   [9200477]       Generalized abdominal pain   [774023]       Candidal intertrigo   [483180]         Vital Signs     Blood Pressure Pulse Temperature Respirations Height Weight    112/62 mmHg 84 36.5 °C (97.7 °F) 14 1.867 m (6' 1.5\") 79.379 kg (175 lb)    Body Mass Index Oxygen Saturation Smoking Status             22.77 kg/m2 95% Never Smoker          Basic Information     Date Of Birth Sex Race Ethnicity Preferred Language    1990 Male White Non- English      Problem List              ICD-10-CM Priority Class Noted - Resolved    Constipation K59.00   2017 - Present    Diarrhea R19.7   2017 - Present    Osgood-Schlatter's disease of knees, bilateral M92.51, M92.52   2017 - Present    Post-nasal drip R09.82   2017 - Present    Popping of temporomandibular joint on opening of jaw R29.898   2017 - Present      Health Maintenance        Date Due Completion Dates    IMM DTaP/Tdap/Td Vaccine (1 - Tdap) 2008 ---    IMM INFLUENZA (1) 2017 ---            Current Immunizations     No immunizations on file.      Below and/or attached are the medications your provider expects you to take. Review all of your home medications and newly ordered medications with your provider and/or pharmacist. Follow medication instructions as directed by your provider and/or pharmacist. Please keep your medication list with you and share with your provider. Update the information when medications are discontinued, doses are changed, or new medications (including over-the-counter " products) are added; and carry medication information at all times in the event of emergency situations     Allergies:  IBUPROFEN - (reactions not documented)     VICODIN - (reactions not documented)               Medications  Valid as of: August 23, 2017 - 11:54 AM    Generic Name Brand Name Tablet Size Instructions for use    Clotrimazole (Cream) LOTRIMIN 1 % AAA BID until rash resolves        Fluconazole (Tab) DIFLUCAN 150 MG Take 1 Tab by mouth every 7 days for 28 days.        Probiotic Product   Take  by mouth.        .                 Medicines prescribed today were sent to:     [facility] [address] [place] [address] [place], [place] - [address] AT [facility] [email or web address]    [address] [place] [address]    Phone: [phone] Fax: [phone]    Open 24 Hours?: No      Medication refill instructions:       If your prescription bottle indicates you have medication refills left, it is not necessary to call your provider’s office. Please contact your pharmacy and they will refill your medication.    If your prescription bottle indicates you do not have any refills left, you may request refills at any time through one of the following ways: The online Local Matters system (except Urgent Care), by calling your provider’s office, or by asking your pharmacy to contact your provider’s office with a refill request. Medication refills are processed only during regular business hours and may not be available until the next business day. Your provider may request additional information or to have a follow-up visit with you prior to refilling your medication.   *Please Note: Medication refills are assigned a new Rx number when refilled electronically. Your pharmacy may indicate that no refills were authorized even though a new prescription for the same medication is available at the pharmacy. Please request the medicine by name with the pharmacy before contacting your provider for a refill.           [employer]  Code: [email or web address]  Expires: 9/20/2017  4:30 PM    Arieso  A secure, online tool to manage your health information     [employer]’s Arieso® is a secure, online tool that connects you to your personalized health information from the privacy of your home -- day or night - making it very easy for you to manage your healthcare. Once the activation process is completed, you can even access your medical information using the Arieso alexander, which is available for free in the Apple Alexander store or Google Play store.     Arieso provides the following levels of access (as shown below):   My Chart Features   [facility] Primary Care Doctor [facility]  Specialists [facility]  Urgent  Care Non-[facility]  Primary Care  Doctor   Email your healthcare team securely and privately 24/7 X X X    Manage appointments: schedule your next appointment; view details of past/upcoming appointments X      Request prescription refills. X      View recent personal medical records, including lab and immunizations X X X X   View health record, including health history, allergies, medications X X X X   Read reports about your outpatient visits, procedures, consult and ER notes X X X X   See your discharge summary, which is a recap of your hospital and/or ER visit that includes your diagnosis, lab results, and care plan. X X       How to register for Arieso:  1. Go to  [email or web address].  2. Click on the Sign Up Now box, which takes you to the New Member Sign Up page. You will need to provide the following information:  a. Enter your Arieso Access Code exactly as it appears at the top of this page. (You will not need to use this code after you’ve completed the sign-up process. If you do not sign up before the expiration date, you must request a new code.)   b. Enter your date of birth.   c. Enter your home email address.   d. Click Submit, and follow the next screen’s instructions.  3. Create a Arieso ID. This will be your Arieso login ID and  cannot be changed, so think of one that is secure and easy to remember.  4. Create a [employer] password. You can change your password at any time.  5. Enter your Password Reset Question and Answer. This can be used at a later time if you forget your password.   6. Enter your e-mail address. This allows you to receive e-mail notifications when new information is available in [employer].  7. Click Sign Up. You can now view your health information.    For assistance activating your [employer] account, call [phone]

## 2017-08-23 NOTE — PROGRESS NOTES
"Subjective:   No chief complaint on file.    Mahesh Turk is a 27 y.o. male here today for multiple problems as listed below    Mahesh is here for f/u of his candida in his groin area. It is not resolving with topical fluconazole. It is not painful, no discharge, but it is itchy and bothersome. He wears short shorts often and goes running often.    He is also requesting a hepatitis panel as he has persistent diarrhea and abdominal pain, and was suggested by a friend to get it checked. He denies persistent vomiting, no h/o IVDU, no out of country travel. No acute abdominal illness    Current medicines (including changes today)  Current Outpatient Prescriptions   Medication Sig Dispense Refill   • fluconazole (DIFLUCAN) 150 MG tablet Take 1 Tab by mouth every 7 days for 28 days. 4 Tab 1   • Probiotic Product (PROBIOTIC & ACIDOPHILUS EX ST PO) Take  by mouth.     • clotrimazole (LOTRIMIN) 1 % Cream AAA BID until rash resolves 1 Tube 0     No current facility-administered medications for this visit.     He  has no past medical history of Diabetes, Allergy, Cancer (CMS-[facility]), or Asthma.      Current medications, allergies and problems list reviewed and updated in EPIC.    No pertinent past medical history, social history or family medical history       ROS   As above in HPI. All other systems reviewed and are negative        Objective:     Blood pressure 112/62, pulse 84, temperature 36.5 °C (97.7 °F), resp. rate 14, height 1.867 m (6' 1.5\"), weight 79.379 kg (175 lb), SpO2 95 %. Body mass index is 22.77 kg/(m^2).   Physical Exam:  Alert, oriented in no acute distress.  Eye contact is good, speech goal directed, affect calm  CV: regular rate and rhythm.  Abdomen: soft, nontender, No CVAT  Skin: Declined inspection but states intertrigo candida is unchanged from last visit       Assessment and Plan:   The following treatment plan was discussed   1. Diarrhea, unspecified type  HEPATITIS PANEL ACUTE (4 COMPONENTS) "   2. Generalized abdominal pain  HEPATITIS PANEL ACUTE (4 COMPONENTS)   3. Candidal intertrigo  Diflucan 150mg Qweek x 4 weeks  Do not wear tight clothing or short shorts. Recommend naked or cotton skirt/sarong when at home        Followup: PRN, if sx persist

## 2017-09-06 ENCOUNTER — TELEPHONE (OUTPATIENT)
Dept: MEDICAL GROUP | Facility: MEDICAL CENTER | Age: 27
End: 2017-09-06

## 2017-09-06 RX ORDER — FLUCONAZOLE 150 MG/1
150 TABLET ORAL
Qty: 2 TAB | Refills: 0 | Status: SHIPPED | OUTPATIENT
Start: 2017-09-06 | End: 2017-09-20

## 2017-09-06 NOTE — TELEPHONE ENCOUNTER
Pt called and stated at last visit he was given diflucan for a yeast infection and was advised if no relief he would need to cb to have the dosage increased. He states that he isn't having relief and is requesting an increase in dosing [phone] (home)

## 2017-09-06 NOTE — TELEPHONE ENCOUNTER
Spoke with patient and informed him that it may take up to 6 weeks of 150mg Diflucan weekly for intertrigo candida to resolve. As he has only taken 2 dosages, he should remain on this medication for an additional month. Also advised to use loose-fitting clothing and sleep naked from waist doen to promote airflow. Pt verbalizes understanding and agrees with plan. Will add 2 more diflucan 150mg tabs to pharmacy, as original rx was for 4 weeks only.

## 2017-12-19 ENCOUNTER — OFFICE VISIT (OUTPATIENT)
Dept: MEDICAL GROUP | Facility: MEDICAL CENTER | Age: 27
End: 2017-12-19
Attending: NURSE PRACTITIONER
Payer: MEDICAID

## 2017-12-19 VITALS
RESPIRATION RATE: 16 BRPM | BODY MASS INDEX: 24.12 KG/M2 | DIASTOLIC BLOOD PRESSURE: 80 MMHG | TEMPERATURE: 98.8 F | WEIGHT: 182 LBS | SYSTOLIC BLOOD PRESSURE: 115 MMHG | HEIGHT: 73 IN | HEART RATE: 72 BPM | OXYGEN SATURATION: 97 %

## 2017-12-19 DIAGNOSIS — Z74.8 ASSISTANCE WITH TRANSPORTATION: ICD-10-CM

## 2017-12-19 DIAGNOSIS — Z87.19 S/P INGUINAL HERNIA REPAIR: ICD-10-CM

## 2017-12-19 DIAGNOSIS — Z98.890 S/P INGUINAL HERNIA REPAIR: ICD-10-CM

## 2017-12-19 DIAGNOSIS — B07.0 PLANTAR WARTS: ICD-10-CM

## 2017-12-19 PROBLEM — B07.8 OTHER VIRAL WARTS: Status: ACTIVE | Noted: 2017-12-19

## 2017-12-19 PROCEDURE — 99214 OFFICE O/P EST MOD 30 MIN: CPT | Performed by: NURSE PRACTITIONER

## 2017-12-19 PROCEDURE — 99213 OFFICE O/P EST LOW 20 MIN: CPT | Performed by: NURSE PRACTITIONER

## 2017-12-19 NOTE — ASSESSMENT & PLAN NOTE
"Pt reports \"wart\" is a concern on left foot.  States noticed 3 months ago noticed wart growing at base of toes.  Has not tried any tx.  Discussed Liquid Nitrogen Tx today and RX for Salicylic Acid for home tx BID  Or if not covered by insurance, he may try otc wart-off or equivalent for daily use  Over the next few weeks.  If no improvement, he may schedule w me or Romy for Acetic Acid applications  As needed to resolve Plantar wart.  "

## 2017-12-19 NOTE — ASSESSMENT & PLAN NOTE
Pt reports needs temporary DMV placard as he is recovering from  Right inguinal hernia repair.  Discussed he thought he might need 3-4 months.

## 2017-12-19 NOTE — ASSESSMENT & PLAN NOTE
S/p Right inguinal hernia repair in [place]  States needs temporary DMV paperwork to help w close parking.  States trouble getting around as walking slow.  He also is asking for a letter for his wife, Kate Peres who has  FMLA at her work and has taken the week off at end of week  To help care for Mahesh as he recovers.

## 2017-12-19 NOTE — LETTER
December 19, 2017       Patient: Mahesh Turk   YOB: 1990   Date of Visit: 12/19/2017         To Whom It May Concern:     Mahesh Turk recently had right inguinal repair surgery and is recuperating at home w limited mobility. Please excuse his wife, Kate Peres from work to assist him during his recovery from 12/22 through 12/28/17.    If you have any questions or concerns, please don't hesitate to call [phone]          Sincerely,          Shilo Lockwood A.P.N.  Electronically Signed

## 2017-12-19 NOTE — PROGRESS NOTES
"Mahesh presents to the clinic asking for DMV handicap placard paperwork.  His PCP, Rehana DE LEON is not available to see him today.    His PMH includes    Inguinal Hernia  Groin Candida  Diarrhea alternating w Constipation  TMJ popping on opening jaw  Post Nasal Drip  Osgood Schlatter's ds of knees, bilat  Dust Mite Allergy    Nev  Report  No Entries    Review of Records  9/6/17 Telephone Encounter Pt regarding Diflucan RX and groin rash, Rx for a few more wks of Diflucan PO once per week.  8/23/17 Last Clinic Visit for groin candida rash, chronic diarrhea alternating w constipation. Labs ordered  Diflucan every week x 4 weeks.    Chief Complaint: Plantar wart, DMV papers, Note for Wife so she can care for him x 1 week    HPI:      Assistance with transportation  Pt reports needs temporary DMV placard as he is recovering from  Right inguinal hernia repair.  Discussed he thought he might need 3-4 months.      Plantar wart  Pt reports \"wart\" is a concern on left foot.  States noticed 3 months ago noticed wart growing at base of toes.  Has not tried any tx.  Discussed Liquid Nitrogen Tx today and RX for Salicylic Acid for home tx BID  Or if not covered by insurance, he may try otc wart-off or equivalent for daily use  Over the next few weeks.  If no improvement, he may schedule w me or Romy for Acetic Acid applications  As needed to resolve Plantar wart.    S/P inguinal hernia repair  S/p Right inguinal hernia repair in [place]  States needs temporary DMV paperwork to help w close parking.  States trouble getting around as walking slow.  He also is asking for a letter for his wife, Kate Peres who has  FMLA at her work and has taken the week off at end of week  To help care for Mahesh as he recovers.        Patient Active Problem List    Diagnosis Date Noted   • Assistance with transportation 12/19/2017   • Plantar warts 12/19/2017   • S/P inguinal hernia repair 12/19/2017   • Post-nasal drip 07/12/2017   • " "Popping of temporomandibular joint on opening of jaw 07/12/2017   • Constipation 06/05/2017   • Diarrhea 06/05/2017   • Osgood-Schlatter's disease of knees, bilateral 06/05/2017       Allergies:Ibuprofen and Vicodin [hydrocodone-acetaminophen]    Current Outpatient Prescriptions   Medication Sig Dispense Refill   • salicylic acid-lactic acid 17 % external solution Apply a few drops to plantar wart of left foot twice daily until resolved. 17- 17% solution whatever is covered by insurance please. 1 Bottle 1   • Probiotic Product (PROBIOTIC & ACIDOPHILUS EX ST PO) Take  by mouth.     • clotrimazole (LOTRIMIN) 1 % Cream AAA BID until rash resolves 1 Tube 0     No current facility-administered medications for this visit.        Social History   Substance Use Topics   • Smoking status: Never Smoker   • Smokeless tobacco: Never Used   • Alcohol use No       Family History   Problem Relation Age of Onset   • Hypertension Father    • Alcohol/Drug Father    • Alcohol/Drug Sister    • Cancer Maternal Aunt    • Cancer Maternal Uncle    • Alcohol/Drug Maternal Uncle    • Heart Disease Maternal Grandmother    • Heart Disease Maternal Grandfather    • Stroke Maternal Grandfather        ROS:  Review of Systems   See HPI Above    Exam:  Blood pressure 115/80, pulse 72, temperature 37.1 °C (98.8 °F), resp. rate 16, height 1.854 m (6' 1\"), weight 82.6 kg (182 lb), SpO2 97 %.  General:  Well nourished, well developed male in NAD  HENT:Head is grossly normal. PERRL.  Neck: Supple. Trachea is midline.  Pulmonary: Speaks in clear sentences with ease.  Normal effort.    Cardiovascular: Regular rate and rhythm.  Abdomen-Abdomen is soft, No tenderness. Healing surgical site w steri strips in place RLQ of abd, no drainage of swelling or redness.  Upper extremities- Strong = . Good ROM  Lower extremities- neg for edema, redness, tenderness. Small plantar wart on left foot .  Neuro- A & O x 4. Speech clear and appropriate. Walks slowly w " good balance.     Current medications, allergies, and problem list reviewed with patient and updated in  EPIC today.    Assessment/Plan:  1. Assistance with transportation  DMV temporary disabled placard paperwork completed and scanned into media   2. Plantar warts  Procedure: Application of Liquid Nitrogen until quite blanched x 2 to small plantar wart of left foot. RX  salicylic acid-lactic acid 17 % external solution  If no improvement w home tx after a few weeks call/return to me or Romy Head for Acetic Acid tx's   3. S/P inguinal hernia repair  Note for wife off work x 1 week. See letter     Follow up as needed. Call or return if questions, concerns, or worsening condition.

## 2018-02-12 ENCOUNTER — OFFICE VISIT (OUTPATIENT)
Dept: MEDICAL GROUP | Facility: MEDICAL CENTER | Age: 28
End: 2018-02-12
Attending: NURSE PRACTITIONER
Payer: MEDICAID

## 2018-02-12 VITALS
TEMPERATURE: 97.5 F | RESPIRATION RATE: 16 BRPM | SYSTOLIC BLOOD PRESSURE: 114 MMHG | BODY MASS INDEX: 23.64 KG/M2 | DIASTOLIC BLOOD PRESSURE: 68 MMHG | WEIGHT: 178.4 LBS | HEART RATE: 75 BPM | OXYGEN SATURATION: 97 % | HEIGHT: 73 IN

## 2018-02-12 DIAGNOSIS — B07.0 PLANTAR WART OF RIGHT FOOT: ICD-10-CM

## 2018-02-12 PROCEDURE — 99213 OFFICE O/P EST LOW 20 MIN: CPT | Performed by: NURSE PRACTITIONER

## 2018-02-13 NOTE — PROGRESS NOTES
"Subjective:     Chief Complaint   Patient presents with   • Warts   • Rash     on head     Mahesh Turk is a 27 y.o. male here today for multiple problems as listed below    Mahesh is ehre for cryo #2 for plantar wart of right foot. States the first cryo was about 1 month ago. No additional concerns.    Current medicines (including changes today)  Current Outpatient Prescriptions   Medication Sig Dispense Refill   • salicylic acid-lactic acid 17 % external solution Apply a few drops to plantar wart of left foot twice daily until resolved. 17- 17% solution whatever is covered by insurance please. 1 Bottle 1   • Probiotic Product (PROBIOTIC & ACIDOPHILUS EX ST PO) Take  by mouth.     • clotrimazole (LOTRIMIN) 1 % Cream AAA BID until rash resolves 1 Tube 0     No current facility-administered medications for this visit.      He  has no past medical history of Allergy; Asthma; Cancer (CMS-[facility]); or Diabetes.      Current medications, allergies and problems list reviewed and updated in EPIC.      ROS   No chest pain, no shortness of breath, no abdominal pain       Objective:     Blood pressure 114/68, pulse 75, temperature 36.4 °C (97.5 °F), resp. rate 16, height 1.854 m (6' 1\"), weight 80.9 kg (178 lb 6.4 oz), SpO2 97 %. Body mass index is 23.54 kg/m².   Physical Exam:  Alert, oriented in no acute distress.  Eye contact is good, speech goal directed, affect calm  Foot: 3mm right plantar wart, inferior of 3rd toe      Assessment and Plan:   The following treatment plan was discussed   1. Plantar wart of right foot  KY jelly applied to healthy skin around wart  Cryotherapy applied for 10 seconds, until wart achieved white color  Advised RTC 2 weeks if wart persist for another dose of cryo          Followup: 2-4 weeks for cryo #3  "

## 2018-02-26 ENCOUNTER — TELEPHONE (OUTPATIENT)
Dept: MEDICAL GROUP | Facility: MEDICAL CENTER | Age: 28
End: 2018-02-26

## 2018-02-26 ENCOUNTER — OFFICE VISIT (OUTPATIENT)
Dept: MEDICAL GROUP | Facility: MEDICAL CENTER | Age: 28
End: 2018-02-26
Attending: NURSE PRACTITIONER
Payer: MEDICAID

## 2018-02-26 VITALS
WEIGHT: 173 LBS | TEMPERATURE: 98 F | OXYGEN SATURATION: 97 % | BODY MASS INDEX: 22.93 KG/M2 | HEIGHT: 73 IN | HEART RATE: 68 BPM | RESPIRATION RATE: 18 BRPM

## 2018-02-26 DIAGNOSIS — Z87.19 S/P INGUINAL HERNIA REPAIR: ICD-10-CM

## 2018-02-26 DIAGNOSIS — B07.0 PLANTAR WARTS: ICD-10-CM

## 2018-02-26 DIAGNOSIS — Z98.890 S/P INGUINAL HERNIA REPAIR: ICD-10-CM

## 2018-02-26 PROCEDURE — 99213 OFFICE O/P EST LOW 20 MIN: CPT | Performed by: NURSE PRACTITIONER

## 2018-02-26 NOTE — PROGRESS NOTES
"Subjective:     Chief Complaint   Patient presents with   • Warts     on foot f.v     Mahesh Turk is a 27 y.o. male here today for multiple problems as listed below    Mahesh is here for f/u plantar wart and cryo #2. He states after the previous cryo treatment his wart blistered but never fell off. He casie any d/c or associated pain.    He also requests a note for workers compensation. 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    Current medicines (including changes today)  Current Outpatient Prescriptions   Medication Sig Dispense Refill   • salicylic acid-lactic acid 17 % external solution Apply a few drops to plantar wart of left foot twice daily until resolved. 17- 17% solution whatever is covered by insurance please. 1 Bottle 1   • Probiotic Product (PROBIOTIC & ACIDOPHILUS EX ST PO) Take  by mouth.     • clotrimazole (LOTRIMIN) 1 % Cream AAA BID until rash resolves 1 Tube 0     No current facility-administered medications for this visit.      He  has no past medical history of Allergy; Asthma; Cancer (CMS-[facility]); or Diabetes.      Current medications, allergies and problems list reviewed and updated in EPIC.    No pertinent past medical history, social history or family medical history       ROS   As above in HPI. All other systems reviewed and are negative        Objective:     Pulse 68, temperature 36.7 °C (98 °F), resp. rate 18, height 1.854 m (6' 1\"), weight 78.5 kg (173 lb), SpO2 97 %. Body mass index is 22.82 kg/m².   Physical Exam:  Alert, oriented in no acute distress.  Eye contact is good, speech goal directed, affect calm  Abdomen: soft, nontender, No CVAT, well-approximated, healing scars from s/p surgery with slight edema of inguinal and suprapubic region, non-tender to touch. No d/c  Ext: no edema, color normal, vascularity normal, temperature normal  Skin plantar wart    Procedure:  KY jelly " applied to healthy skin around wart  Cryotherapy applied for 10 seconds, until wart achieved white color        Assessment and Plan:   The following treatment plan was discussed   1. Plantar warts  Cryo #2 applied. RTC in 2 weeks for treatment #3 if wart still has not resolved   2. S/P inguinal hernia repair  Note written for workers comp. F/U with [facility] surgeon in July       Followup: PRN

## 2018-02-26 NOTE — LETTER
February 26, 2018       Patient: Mahesh Turk   YOB: 1990   Date of Visit: 2/26/2018         To Whom It May Concern:    It is my medical opinion that Mahesh Turk has been suffering from an inguinal hernia that needed surgical repair. This surgical repair caused multiple issues and he needed another surgical repair at [facility]. Since that time, Mahesh is slowly healing but still has pain and swelling in the area. He has a follow up appointment with his surgeon in [facility] in July 2018.    If you have any questions or concerns, please don't hesitate to call [phone]          Sincerely,          EVON Britt.

## 2018-02-26 NOTE — TELEPHONE ENCOUNTER
VOICEMAIL  1. Caller Name: Mahesh                      Call Back Number: [phone] (home)       2. Message: pt called and stated he needed to speak with Romy regarding adding something to the letter she wrote him earlier, he would like a cb to discuss    3. Patient approves office to leave a detailed voicemail/MyChart message: yes

## 2018-02-27 NOTE — TELEPHONE ENCOUNTER
"Pt called this morning, he stated his place of employment told him the only thing that needs to be added to the letter is \"He is unable to work at this time\". And would like the letter faxed to [phone] ATTN: Rach Ochoa, I took care of this request  "

## 2018-02-27 NOTE — TELEPHONE ENCOUNTER
Pt called back and stated that letter needed to be co-signed by an MD, I took care of this and faxed back per patients request